# Patient Record
Sex: MALE | Race: OTHER | HISPANIC OR LATINO | URBAN - METROPOLITAN AREA
[De-identification: names, ages, dates, MRNs, and addresses within clinical notes are randomized per-mention and may not be internally consistent; named-entity substitution may affect disease eponyms.]

---

## 2019-05-20 ENCOUNTER — EMERGENCY (EMERGENCY)
Facility: HOSPITAL | Age: 29
LOS: 1 days | Discharge: HOME | End: 2019-05-20
Attending: STUDENT IN AN ORGANIZED HEALTH CARE EDUCATION/TRAINING PROGRAM
Payer: COMMERCIAL

## 2019-05-20 VITALS
SYSTOLIC BLOOD PRESSURE: 102 MMHG | WEIGHT: 164.91 LBS | OXYGEN SATURATION: 98 % | HEIGHT: 67 IN | DIASTOLIC BLOOD PRESSURE: 67 MMHG | TEMPERATURE: 99 F | HEART RATE: 94 BPM | RESPIRATION RATE: 18 BRPM

## 2019-05-20 DIAGNOSIS — Z98.89 OTHER SPECIFIED POSTPROCEDURAL STATES: Chronic | ICD-10-CM

## 2019-05-20 LAB
HETEROPH AB TITR SER AGGL: NEGATIVE — SIGNIFICANT CHANGE UP
S PYO AG SPEC QL IA: NEGATIVE — SIGNIFICANT CHANGE UP

## 2019-05-20 PROCEDURE — 99283 EMERGENCY DEPT VISIT LOW MDM: CPT

## 2019-05-20 PROCEDURE — 36415 COLL VENOUS BLD VENIPUNCTURE: CPT

## 2019-05-20 PROCEDURE — 99283 EMERGENCY DEPT VISIT LOW MDM: CPT | Mod: 25

## 2019-05-20 PROCEDURE — 86308 HETEROPHILE ANTIBODY SCREEN: CPT

## 2019-05-20 PROCEDURE — 96372 THER/PROPH/DIAG INJ SC/IM: CPT

## 2019-05-20 PROCEDURE — 87880 STREP A ASSAY W/OPTIC: CPT

## 2019-05-20 PROCEDURE — 87081 CULTURE SCREEN ONLY: CPT

## 2019-05-20 RX ORDER — DEXAMETHASONE 0.5 MG/5ML
10 ELIXIR ORAL ONCE
Refills: 0 | Status: COMPLETED | OUTPATIENT
Start: 2019-05-20 | End: 2019-05-20

## 2019-05-20 RX ORDER — LIDOCAINE 4 G/100G
30 CREAM TOPICAL ONCE
Refills: 0 | Status: COMPLETED | OUTPATIENT
Start: 2019-05-20 | End: 2019-05-20

## 2019-05-20 RX ORDER — OXYCODONE AND ACETAMINOPHEN 5; 325 MG/1; MG/1
1 TABLET ORAL ONCE
Refills: 0 | Status: DISCONTINUED | OUTPATIENT
Start: 2019-05-20 | End: 2019-05-20

## 2019-05-20 RX ORDER — PENICILLIN G BENZATHINE 1200000 [IU]/2ML
1.2 INJECTION, SUSPENSION INTRAMUSCULAR ONCE
Refills: 0 | Status: COMPLETED | OUTPATIENT
Start: 2019-05-20 | End: 2019-05-20

## 2019-05-20 RX ADMIN — LIDOCAINE 30 MILLILITER(S): 4 CREAM TOPICAL at 20:36

## 2019-05-20 RX ADMIN — OXYCODONE AND ACETAMINOPHEN 1 TABLET(S): 5; 325 TABLET ORAL at 16:30

## 2019-05-20 RX ADMIN — PENICILLIN G BENZATHINE 1.2 MILLION UNIT(S): 1200000 INJECTION, SUSPENSION INTRAMUSCULAR at 21:31

## 2019-05-20 RX ADMIN — Medication 10 MILLIGRAM(S): at 16:30

## 2019-05-20 RX ADMIN — Medication 30 MILLILITER(S): at 20:36

## 2019-05-20 NOTE — ED PROVIDER NOTE - CARE PLAN
Principal Discharge DX:	Pharyngitis  Assessment and plan of treatment:	Continue with Lidocaine and Maalox solution   Warm alt water gargle   F/U with PCP/ [facility] as discussed

## 2019-05-20 NOTE — ED PROVIDER NOTE - ATTENDING CONTRIBUTION TO CARE
I personally saw the patient with the PA, and completed the key components of the history and physical exam. I then discussed the management plan with the PA.  28 yr old M presented to ED for throat pain x 1 day - with tonsillar exudates, midline uvula, no evidence of PTA - r/o strep

## 2019-05-20 NOTE — ED PROVIDER NOTE - PLAN OF CARE
Continue with Lidocaine and Maalox solution   Warm alt water gargle   F/U with PCP/ [facility] clinic as discussed

## 2019-05-20 NOTE — ED PROVIDER NOTE - CLINICAL SUMMARY MEDICAL DECISION MAKING FREE TEXT BOX
28 yr old M presented to ED for throat pain x 1 day - with tonsillar exudates, midline uvula, no evidence of PTA - r/o strep

## 2019-05-20 NOTE — ED PROVIDER NOTE - PROGRESS NOTE DETAILS
Pt examination + exudative pharyngitis. Rapid strep negative and culture collected. Pt treated with Decadron and Percocet in ED. Pt admits to slight improvement and is able to tolerated PO . Infectious Schley test conducted. Infectious Mono negative and pt treated with Pen G Im 1.2 Million Units. Pt given PO viscous Lidocaine and Maalox mixture and D/C home in stable condition.

## 2019-05-20 NOTE — ED ADULT TRIAGE NOTE - CHIEF COMPLAINT QUOTE
'I have swelling on my tonsils and I think I need to have them taken out. I can't even talk and am having difficulty swallowing liquid, I haven't tried to eat today" pt A & OX4, tolerating salvia.

## 2019-05-20 NOTE — ED PROVIDER NOTE - OBJECTIVE STATEMENT
28 yr old M presented to ED for throat pain x 1 day. Pt states  that he developed pain yesterday night and it got progressively worst and no he cannot swallow his own saliva. Pt says that he did not have any fever or chills any explained that he took OTC pain medication that has done little to help. Pt denies any SOB or difficulty breathing.

## 2019-05-22 LAB
CULTURE RESULTS: SIGNIFICANT CHANGE UP
SPECIMEN SOURCE: SIGNIFICANT CHANGE UP
